# Patient Record
Sex: MALE | Race: WHITE
[De-identification: names, ages, dates, MRNs, and addresses within clinical notes are randomized per-mention and may not be internally consistent; named-entity substitution may affect disease eponyms.]

---

## 2019-07-22 ENCOUNTER — HOSPITAL ENCOUNTER (OUTPATIENT)
Dept: HOSPITAL 44 - RAD | Age: 69
End: 2019-07-22
Attending: PODIATRIST
Payer: MEDICARE

## 2019-07-22 DIAGNOSIS — M79.671: Primary | ICD-10-CM

## 2019-07-22 NOTE — DIAGNOSTIC IMAGING REPORT
BAILEY HUNT 

[facility]

[id number] [facility] [address] [address]

 

 

 

 

Report Submission Date: 2019 10:32:44 AM CDT

Patient       Study

Name:   MAC POP       Date:   2019 10:06:40 AM CDT

MRN:   [id number]       Modality Type:   DX

Gender:   M       Description:   BILAT FEET 3 VIEW

:   52       Institution:   [facility]

Physician:   BAILEY HUNT

     Accession:    [id number]

 

 

Examination: Plain film feet   



History:  RT FOOT PAIN AND DEFORMITY 



Findings: 3 views of the right and left foot demonstrates osteopenia. 1st digit 
metatarsophalangeal degenerative changes and mild hallux valgus deformity. No 
acute appearing cortical abnormality. Calcaneal spurs. 



Impression: Osteopenia and degenerative changes. 1st digit hallux valgus 
deformity.

 

Electronically signed on 2019 10:32:44 AM CDT by:

Vadim SUN